# Patient Record
Sex: FEMALE | Race: AMERICAN INDIAN OR ALASKA NATIVE
[De-identification: names, ages, dates, MRNs, and addresses within clinical notes are randomized per-mention and may not be internally consistent; named-entity substitution may affect disease eponyms.]

---

## 2022-06-18 ENCOUNTER — HOSPITAL ENCOUNTER (EMERGENCY)
Dept: HOSPITAL 5 - ED | Age: 37
Discharge: HOME | End: 2022-06-18
Payer: COMMERCIAL

## 2022-06-18 VITALS — DIASTOLIC BLOOD PRESSURE: 90 MMHG | SYSTOLIC BLOOD PRESSURE: 138 MMHG

## 2022-06-18 DIAGNOSIS — D25.9: ICD-10-CM

## 2022-06-18 DIAGNOSIS — K76.0: Primary | ICD-10-CM

## 2022-06-18 DIAGNOSIS — R10.9: ICD-10-CM

## 2022-06-18 LAB
ALBUMIN SERPL-MCNC: 3.9 G/DL (ref 3.9–5)
ALT SERPL-CCNC: 78 UNITS/L (ref 7–56)
BASOPHILS # (AUTO): 0.1 K/MM3 (ref 0–0.1)
BASOPHILS NFR BLD AUTO: 0.6 % (ref 0–1.8)
BILIRUB UR QL STRIP: (no result)
BLOOD UR QL VISUAL: (no result)
BUN SERPL-MCNC: 10 MG/DL (ref 7–17)
BUN/CREAT SERPL: 17 %
CALCIUM SERPL-MCNC: 8.9 MG/DL (ref 8.4–10.2)
EOSINOPHIL # BLD AUTO: 0.2 K/MM3 (ref 0–0.4)
EOSINOPHIL NFR BLD AUTO: 2.1 % (ref 0–4.3)
HCT VFR BLD CALC: 37.4 % (ref 30.3–42.9)
HEMOLYSIS INDEX: 2
HGB BLD-MCNC: 12.7 GM/DL (ref 10.1–14.3)
LYMPHOCYTES # BLD AUTO: 3.4 K/MM3 (ref 1.2–5.4)
LYMPHOCYTES NFR BLD AUTO: 36.7 % (ref 13.4–35)
MCHC RBC AUTO-ENTMCNC: 34 % (ref 30–34)
MCV RBC AUTO: 80 FL (ref 79–97)
MONOCYTES # (AUTO): 0.9 K/MM3 (ref 0–0.8)
MONOCYTES % (AUTO): 9.7 % (ref 0–7.3)
MUCOUS THREADS #/AREA URNS HPF: (no result) /HPF
PH UR STRIP: 5 [PH] (ref 5–7)
PLATELET # BLD: 230 K/MM3 (ref 140–440)
RBC # BLD AUTO: 4.69 M/MM3 (ref 3.65–5.03)
RBC #/AREA URNS HPF: 1 /HPF (ref 0–6)
UROBILINOGEN UR-MCNC: < 2 MG/DL (ref ?–2)
WBC #/AREA URNS HPF: 8 /HPF (ref 0–6)

## 2022-06-18 PROCEDURE — 85025 COMPLETE CBC W/AUTO DIFF WBC: CPT

## 2022-06-18 PROCEDURE — 74178 CT ABD&PLV WO CNTR FLWD CNTR: CPT

## 2022-06-18 PROCEDURE — 36415 COLL VENOUS BLD VENIPUNCTURE: CPT

## 2022-06-18 PROCEDURE — 81001 URINALYSIS AUTO W/SCOPE: CPT

## 2022-06-18 PROCEDURE — 84702 CHORIONIC GONADOTROPIN TEST: CPT

## 2022-06-18 PROCEDURE — 81025 URINE PREGNANCY TEST: CPT

## 2022-06-18 PROCEDURE — 80053 COMPREHEN METABOLIC PANEL: CPT

## 2022-06-18 PROCEDURE — 99284 EMERGENCY DEPT VISIT MOD MDM: CPT

## 2022-06-18 NOTE — CAT SCAN REPORT
CT ABDOMEN AND PELVIS WITHOUT AND WITH CONTRAST



INDICATION / CLINICAL INFORMATION: abd pain.



TECHNIQUE: Axial CT images were obtained through the abdomen and pelvis before and after IV contrast.
  All CT scans at this location are performed using CT dose reduction for ALARA by means of automated
 exposure control. 



COMPARISON: None available.



FINDINGS:



LOWER CHEST: Lung bases are clear



Abdomen/Pelvis: There is diffuse fatty infiltration of the liver. Spleen, adrenal glands, pancreas, g
allbladder and upper GI tract appear normal. Small hiatal hernia. Minimal inflammation stranding in t
he mesentery however this is nonspecific. No bowel obstruction is seen. No evidence for appendicitis.
 Uterus is enlarged with endometrial fluid in probable fibroids. No bowel obstruction is seen. No def
inite renal or ureteral stones are identified.



SKELETAL SYSTEM: No significant abnormality.



IMPRESSION:

1. Fatty infiltration of the liver.

2. Uterus is heterogeneous and enlarged with endometrial fluid in uterine fibroids. Abnormal appearan
ce in lower uterine segment and cervix with questionable fluid/gas and debris. This appears separate 
from the adjacent bowel loops. Clinical correlation with pelvic examination and evaluation of  the ce
rvix is recommended.



Signer Name: Hubert Case MD 

Signed: 6/18/2022 4:49 PM

Workstation Name: [id number]

## 2022-06-18 NOTE — EMERGENCY DEPARTMENT REPORT
ED Female  HPI





- General


Chief complaint: Abdominal Pain


Stated complaint: BLEEDING/POSS PREGNANT


Source: patient


Mode of arrival: Ambulatory


Limitations: No Limitations





- History of Present Illness


Initial comments: 


36-year-old female presents to the ED complaining frequent urination, abdominal 

pain , and nausea.  Patient states that last menstrual cycles were 3/27/2022.  

She states that she has been having irregular cycle.  States she took 4 home 

pregnancy test.  Her test was negative and 1 test was positive.  Patient states 

that she has a tubal ligation and was concerned she may be pregnant.  Patient 

denies any abdominal pain at present time. Patient  Is alert and oriented x3.  

No acute distress noted.  No ill appearance noted.





Onset/Timin


-: days(s)


Associated Symptoms: denies other symptoms





- Related Data


Sexually active: Yes


                                    Allergies











Allergy/AdvReac Type Severity Reaction Status Date / Time


 


Penicillins Allergy  Swelling Verified 22 09:33














ED Review of Systems


ROS: 


Stated complaint: BLEEDING/POSS PREGNANT


Other details as noted in HPI





Constitutional: denies: chills, fever


Eyes: denies: eye pain, eye discharge, vision change


ENT: denies: ear pain, throat pain


Respiratory: denies: cough, shortness of breath, wheezing


Cardiovascular: denies: chest pain, palpitations


Endocrine: no symptoms reported


Gastrointestinal: denies: abdominal pain, nausea, diarrhea


Genitourinary: denies: urgency, dysuria, discharge


Musculoskeletal: denies: back pain, joint swelling, arthralgia


Skin: denies: rash, lesions


Neurological: denies: headache, weakness, paresthesias


Psychiatric: denies: anxiety, depression


Hematological/Lymphatic: denies: easy bleeding, easy bruising





ED Past Medical Hx





- Past Medical History


Previous Medical History?: Yes





- Surgical History


Past Surgical History?: Yes


Additional Surgical History:  x 2





ED Physical Exam





- General


Limitations: No Limitations


General appearance: alert, in no apparent distress





- Head


Head exam: Present: atraumatic, normocephalic





- Eye


Eye exam: Present: normal appearance





- ENT


ENT exam: Present: mucous membranes moist





- Neck


Neck exam: Present: normal inspection





- Respiratory


Respiratory exam: Present: normal lung sounds bilaterally.  Absent: respiratory 

distress





- Cardiovascular


Cardiovascular Exam: Present: regular rate, normal rhythm.  Absent: systolic 

murmur, diastolic murmur, rubs, gallop





- GI/Abdominal


GI/Abdominal exam: Present: soft, normal bowel sounds





- Extremities Exam


Extremities exam: Present: normal inspection





- Back Exam


Back exam: Present: normal inspection





- Neurological Exam


Neurological exam: Present: alert, oriented X3





- Psychiatric


Psychiatric exam: Present: normal affect, normal mood





- Skin


Skin exam: Present: warm, dry, intact, normal color.  Absent: rash





ED Course


                                   Vital Signs











  22





  09:28 14:41


 


Temperature 98.9 F 98.9 F


 


Pulse Rate 127 H 99 H


 


Respiratory 18 20





Rate  


 


Blood Pressure 138/87 159/103


 


Blood Pressure  159/103





[Right]  


 


O2 Sat by Pulse 97 97





Oximetry  














ED Medical Decision Making





- Lab Data


Result diagrams: 


                                 22 10:28





                                 22 10:28





- Radiology Data


[facility]  


                                     [address] [id number]  


 


                                          Cat Scan Report   


                                               Signed  


 


Patient: TAPLEY,ADRIANA                                                         

       MR#: [id number]  


07          


: 1985                                                                

Acct:[id number]      


 


Age/Sex: 36 / F                                                                

ADM Date: 22     


 


Loc: ED       


Attending Dr:   


 


 


Ordering Physician: GUI GRAY  


Date of Service: 22  


Procedure(s): CT abdomen pelvis wo/w con  


Accession Number(s): [id number]  


 


cc: GUI GRAY   


 


 


CT ABDOMEN AND PELVIS WITHOUT AND WITH CONTRAST  


 


 INDICATION / CLINICAL INFORMATION: abd pain.  


 


 TECHNIQUE: Axial CT images were obtained through the abdomen and pelvis before 

and after IV 


contrast.  All CT scans at this location are performed using CT dose reduction 

for ALARA by means of


automated exposure control.   


 


 COMPARISON: None available.  


 


 FINDINGS:  


 


 LOWER CHEST: Lung bases are clear  


 


 Abdomen/Pelvis: There is diffuse fatty infiltration of the liver. Spleen, 

adrenal glands, pancreas,


gallbladder and upper GI tract appear normal. Small hiatal hernia. Minimal 

inflammation stranding in


the mesentery however this is nonspecific. No bowel obstruction is seen. No 

evidence for 


appendicitis. Uterus is enlarged with endometrial fluid in probable fibroids. No

 bowel obstruction 


is seen. No definite renal or ureteral stones are identified.  


 


 SKELETAL SYSTEM: No significant abnormality.  


 


 IMPRESSION:  


 1. Fatty infiltration of the liver.  


 2. Uterus is heterogeneous and enlarged with endometrial fluid in uterine 

fibroids. Abnormal 


appearance in lower uterine segment and cervix with questionable fluid/gas and 

debris. This appears 


separate from the adjacent bowel loops. Clinical correlation with pelvic 

examination and evaluation 


of  the cervix is recommended.  


 


 Signer Name: Hubert Case MD   


 Signed: 2022 4:49 PM  


 Workstation Name: [id number]   


 


 


Transcribed By: LILA  


Dictated By: BRANDON CASE MD  


Electronically Authenticated By: BRANDON CASE MD    


Signed Date/Time: 22                                


 


 


 


DD/DT: 22                                                            

  


TD/TT:








- Medical Decision Making


36-year-old female presents to the ED complaining frequent urination, abdominal 

pain , and nausea x2 days .  Patient states that last menstrual cycles were 

3/27/2022.  She states that she has been having irregular cycle.  States she 

took 4 home pregnancy test.  Her test was negative and 1 test was positive.  

Patient states that she has a tubal ligation and was concerned she may be 

pregnant.  Patient denies any abdominal pain at present time. Patient  Is alert 

and oriented x3.  No acute distress noted.  No ill appearance noted.  Physical 

examination is unremarkable.  Treat patient for acute urinary tract infection 

based on patient's symptom.  Patient urine pregnancy test is negative.  CT of 

the abdominal pelvis with pelvis showed fatty liver and urine fibroid.





Rechecked the patient is resting quietly quietly and comfortable and feeling 

better. I discussed the results of diagnostic study, my clinical impression and 

the plan for further treatment with the patient. Patient agrees with plan and 

discharge at this present time. All question addressed.





I have given the patient instruction regarding a diagnosis ,expectation ,follow-

up and return precaution. I explained to the patient that emergent condition may

 arise and to return to the ED for new worsen and any new persisting condition. 

I have explained the importance of following up with the primary care physician 

or referral physician listed below has instructed. The patient verbalized 

understanding of discharge instruction.








                              Abnormal Lab Results











  22





  10:28 10:28 Unknown


 


WBC  9.4  


 


RBC  4.69  


 


Hgb  12.7  


 


Hct  37.4  


 


MCV  80  


 


MCH  27 L  


 


MCHC  34  


 


RDW  15.5 H  


 


Plt Count  230  


 


Lymph % (Auto)  36.7 H  


 


Mono % (Auto)  9.7 H  


 


Eos % (Auto)  2.1  


 


Baso % (Auto)  0.6  


 


Lymph # (Auto)  3.4  


 


Mono # (Auto)  0.9 H  


 


Eos # (Auto)  0.2  


 


Baso # (Auto)  0.1  


 


Seg Neutrophils %  50.9  


 


Seg Neutrophils #  4.8  


 


Sodium   135 L 


 


Potassium   4.0 


 


Chloride   104.0 


 


Carbon Dioxide   21 L 


 


Anion Gap   14 


 


BUN   10 


 


Creatinine   0.6 


 


Estimated GFR   > 60 


 


BUN/Creatinine Ratio   17 


 


Glucose   96 


 


Calcium   8.9 


 


Total Bilirubin   0.20 


 


AST   52 H 


 


ALT   78 H 


 


Alkaline Phosphatase   82 


 


Total Protein   7.7 


 


Albumin   3.9 


 


Albumin/Globulin Ratio   1.0 


 


Urine Color    Yellow


 


Urine Turbidity    Clear


 


Urine pH    5.0


 


Ur Specific Gravity    1.029


 


Urine Protein    30 mg/dl


 


Urine Glucose (UA)    Neg


 


Urine Ketones    Neg


 


Urine Blood    Neg


 


Urine Nitrite    Neg


 


Urine Bilirubin    Neg


 


Urine Urobilinogen    < 2.0


 


Ur Leukocyte Esterase    Neg


 


Urine WBC (Auto)    8.0 H


 


Urine RBC (Auto)    1.0


 


U Epithel Cells (Auto)    5.0


 


Urine Mucus    1+


 


Urine HCG, Qual    Negative














Critical care attestation.: 


If time is entered above; I have spent that time in minutes in the direct care 

of this critically ill patient, excluding procedure time.








ED Disposition


Clinical Impression: 


 Fatty liver





Abdominal pain


Qualifiers:


 Abdominal location: generalized Qualified Code(s): R10.84 - Generalized 

abdominal pain





Uterine fibroid


Qualifiers:


 Uterine leiomyoma location: unspecified location Qualified Code(s): D25.9 - 

Leiomyoma of uterus, unspecified





Disposition: 01 HOME / SELF CARE / HOMELESS


Is pt being admited?: No


Does the pt Need Aspirin: No


Condition: Stable


Instructions:  Abdominal Pain (ED), Uterine Fibroids, Abdominal Pain, Adult, 

Easy-to-Read


Additional Instructions: 


Take some Gas-X for gas


Return to ED for any worsening symptom


Referrals: 


DIEGO MEDINA MD [Primary Care Provider] - 3-5 Days


[facility] [Provider Group] - 3-5 Days


Time of Disposition: 17:36